# Patient Record
Sex: FEMALE | URBAN - METROPOLITAN AREA
[De-identification: names, ages, dates, MRNs, and addresses within clinical notes are randomized per-mention and may not be internally consistent; named-entity substitution may affect disease eponyms.]

---

## 2020-01-09 ENCOUNTER — APPOINTMENT (RX ONLY)
Dept: URBAN - METROPOLITAN AREA CLINIC 7 | Facility: CLINIC | Age: 30
Setting detail: DERMATOLOGY
End: 2020-01-09

## 2020-01-09 DIAGNOSIS — L85.3 XEROSIS CUTIS: ICD-10-CM

## 2020-01-09 DIAGNOSIS — L73.8 OTHER SPECIFIED FOLLICULAR DISORDERS: ICD-10-CM

## 2020-01-09 DIAGNOSIS — Z80.8 FAMILY HISTORY OF MALIGNANT NEOPLASM OF OTHER ORGANS OR SYSTEMS: ICD-10-CM

## 2020-01-09 DIAGNOSIS — Z71.89 OTHER SPECIFIED COUNSELING: ICD-10-CM

## 2020-01-09 DIAGNOSIS — D18.0 HEMANGIOMA: ICD-10-CM

## 2020-01-09 DIAGNOSIS — D22 MELANOCYTIC NEVI: ICD-10-CM

## 2020-01-09 DIAGNOSIS — Z41.9 ENCOUNTER FOR PROCEDURE FOR PURPOSES OTHER THAN REMEDYING HEALTH STATE, UNSPECIFIED: ICD-10-CM

## 2020-01-09 PROBLEM — D22.72 MELANOCYTIC NEVI OF LEFT LOWER LIMB, INCLUDING HIP: Status: ACTIVE | Noted: 2020-01-09

## 2020-01-09 PROBLEM — D18.01 HEMANGIOMA OF SKIN AND SUBCUTANEOUS TISSUE: Status: ACTIVE | Noted: 2020-01-09

## 2020-01-09 PROBLEM — D22.71 MELANOCYTIC NEVI OF RIGHT LOWER LIMB, INCLUDING HIP: Status: ACTIVE | Noted: 2020-01-09

## 2020-01-09 PROBLEM — D22.62 MELANOCYTIC NEVI OF LEFT UPPER LIMB, INCLUDING SHOULDER: Status: ACTIVE | Noted: 2020-01-09

## 2020-01-09 PROBLEM — D22.5 MELANOCYTIC NEVI OF TRUNK: Status: ACTIVE | Noted: 2020-01-09

## 2020-01-09 PROCEDURE — ? OBSERVATION

## 2020-01-09 PROCEDURE — ? SUNSCREEN RECOMMENDATIONS

## 2020-01-09 PROCEDURE — ? COUNSELING

## 2020-01-09 PROCEDURE — ? PRODUCT LINE (OFFICE PRODUCTS)

## 2020-01-09 PROCEDURE — 99203 OFFICE O/P NEW LOW 30 MIN: CPT

## 2020-01-09 PROCEDURE — ? OTHER

## 2020-01-09 ASSESSMENT — LOCATION DETAILED DESCRIPTION DERM
LOCATION DETAILED: RIGHT ANTERIOR DISTAL THIGH
LOCATION DETAILED: RIGHT SUPERIOR LATERAL MIDBACK
LOCATION DETAILED: EPIGASTRIC SKIN
LOCATION DETAILED: LEFT INFERIOR UPPER BACK
LOCATION DETAILED: RIGHT SUPERIOR PARIETAL SCALP
LOCATION DETAILED: RIGHT PROXIMAL CALF
LOCATION DETAILED: LEFT PROXIMAL PRETIBIAL REGION
LOCATION DETAILED: LEFT POSTERIOR SHOULDER
LOCATION DETAILED: RIGHT SUPERIOR MEDIAL MIDBACK
LOCATION DETAILED: PERIUMBILICAL SKIN
LOCATION DETAILED: LEFT LATERAL PLANTAR 1ST TOE

## 2020-01-09 ASSESSMENT — LOCATION ZONE DERM
LOCATION ZONE: LEG
LOCATION ZONE: ARM
LOCATION ZONE: SCALP
LOCATION ZONE: TRUNK
LOCATION ZONE: TOE

## 2020-01-09 ASSESSMENT — LOCATION SIMPLE DESCRIPTION DERM
LOCATION SIMPLE: RIGHT CALF
LOCATION SIMPLE: LEFT PRETIBIAL REGION
LOCATION SIMPLE: RIGHT LOWER BACK
LOCATION SIMPLE: RIGHT THIGH
LOCATION SIMPLE: SCALP
LOCATION SIMPLE: PLANTAR SURFACE OF LEFT 1ST TOE
LOCATION SIMPLE: LEFT SHOULDER
LOCATION SIMPLE: ABDOMEN
LOCATION SIMPLE: LEFT UPPER BACK

## 2020-01-09 NOTE — PROCEDURE: MIPS QUALITY
Detail Level: Detailed
Quality 110: Preventive Care And Screening: Influenza Immunization: Influenza Immunization previously received during influenza season
Quality 431: Preventive Care And Screening: Unhealthy Alcohol Use - Screening: Documentation of medical reason(s) for not screening for unhealthy alcohol use (eg, limited life expectancy, other medical reasons)

## 2020-01-09 NOTE — PROCEDURE: PRODUCT LINE (OFFICE PRODUCTS)
Name Of Product 7: [employer] Hyaluronic Acid Intensifier
Product 55 Units: 0
Product 2 Price (In Dollars - Numeric Only, No Special Characters Or $): 0.00
Name Of Product 26: Epionce Lytic Cleanser
Product 7 Price (In Dollars - Numeric Only, No Special Characters Or $): 1.00
Product 2 Units: 1
Name Of Product 12: [employer] Phyto Corrective Masque
Name Of Product 17: EltaMD Physical
Name Of Product 31: EltaMD Pure
Name Of Product 3: Elta MD AM Moisturizer
Name Of Product 22: EltaMD UV Elements SPF 44
Name Of Product 8: [employer] Discoloration Defense
Name Of Product 27: [employer]icals AHA Cleanser
Name Of Product 13: Revision DEJ Eye Cream
Name Of Product 35: Dr. Vinson's Cliff Yim
Detail Level: Zone
Name Of Product 18: Revision Skincare Intellishade TruPhysical Matte SPF 45
Name Of Product 30: Sunbetter™ SHEER SPF 56 Sunscreen Stick
Name Of Product 4: Elta MD PM Moisturizer
Name Of Product 23: Elta MD UV Physical
Allow Plan To Count Towards E/M Coding: Yes
Name Of Product 9: Elta MD Elements Tinted Physical
Name Of Product 28: [employer]Ce[employer]icals Simply Clean
Name Of Product 14: Skinceuticals Metacell Moisturizer
Name Of Product 34: Skinceuticals Body Tight
Send Charges To Patient Encounter: No
Name Of Product 19: Revision DEJ Night Cream
Name Of Product 29: SkinCe[employer]icals CE Ferulic
Name Of Product 5: DEJ Eye Cream
Name Of Product 24: EltaMD UV Replenish
Name Of Product 10: Revision Skincare Intellishade TruPhysical SPF 45
Name Of Product 15: Skinceuticals Triple Lipid Restore
Name Of Product 1: Elta MD Facial Foaming Cleanser
Name Of Product 33: SKinbetter AlphaRet® Overnight Cream
Name Of Product 20: [employer]icals Metacell Renewal B3
Name Of Product 6: Skinceuticals Phloretin Serum
Name Of Product 25: Heliocare Supplements
Name Of Product 11: [employer] Phyto Corrective Gel
Name Of Product 32: Glytone KP Kit
Name Of Product 2: Elta MD UV Clear Tinted
Name Of Product 21: EltaMD UV Daily Tinted SPF 40

## 2020-01-09 NOTE — PROCEDURE: OBSERVATION
Size Of Lesion In Cm (Optional): 0
Detail Level: Detailed
Body Location Override (Optional - Billing Will Still Be Based On Selected Body Map Location If Applicable): left lateral plantar big toe

## 2020-01-09 NOTE — HPI: SKIN LESION
What Type Of Note Output Would You Prefer (Optional)?: Standard Output
How Severe Is Your Skin Lesion?: mild
Has Your Skin Lesion Been Treated?: not been treated
Is This A New Presentation, Or A Follow-Up?: Mole
Which Family Member (Optional)?: Mother
